# Patient Record
Sex: MALE | Race: BLACK OR AFRICAN AMERICAN | NOT HISPANIC OR LATINO | Employment: FULL TIME | URBAN - METROPOLITAN AREA
[De-identification: names, ages, dates, MRNs, and addresses within clinical notes are randomized per-mention and may not be internally consistent; named-entity substitution may affect disease eponyms.]

---

## 2017-11-19 ENCOUNTER — HOSPITAL ENCOUNTER (EMERGENCY)
Facility: HOSPITAL | Age: 37
Discharge: HOME | End: 2017-11-19
Payer: COMMERCIAL

## 2017-11-19 VITALS
TEMPERATURE: 99 F | WEIGHT: 165 LBS | SYSTOLIC BLOOD PRESSURE: 119 MMHG | DIASTOLIC BLOOD PRESSURE: 71 MMHG | BODY MASS INDEX: 25.01 KG/M2 | HEART RATE: 100 BPM | OXYGEN SATURATION: 96 % | HEIGHT: 68 IN | RESPIRATION RATE: 20 BRPM

## 2017-11-19 DIAGNOSIS — S91.332A PENETRATING WOUND OF LEFT FOOT, INITIAL ENCOUNTER: ICD-10-CM

## 2017-11-19 DIAGNOSIS — T14.8XXA PUNCTURE WOUND: Primary | ICD-10-CM

## 2017-11-19 PROCEDURE — 99283 EMERGENCY DEPT VISIT LOW MDM: CPT

## 2017-11-19 PROCEDURE — 90715 TDAP VACCINE 7 YRS/> IM: CPT | Performed by: NURSE PRACTITIONER

## 2017-11-19 PROCEDURE — 63600175 PHARM REV CODE 636 W HCPCS: Performed by: NURSE PRACTITIONER

## 2017-11-19 PROCEDURE — 90471 IMMUNIZATION ADMIN: CPT | Performed by: NURSE PRACTITIONER

## 2017-11-19 RX ORDER — CEPHALEXIN 500 MG/1
500 CAPSULE ORAL 4 TIMES DAILY
Qty: 20 CAPSULE | Refills: 0 | Status: SHIPPED | OUTPATIENT
Start: 2017-11-19 | End: 2017-11-24

## 2017-11-19 RX ADMIN — CLOSTRIDIUM TETANI TOXOID ANTIGEN (FORMALDEHYDE INACTIVATED), CORYNEBACTERIUM DIPHTHERIAE TOXOID ANTIGEN (FORMALDEHYDE INACTIVATED), BORDETELLA PERTUSSIS TOXOID ANTIGEN (GLUTARALDEHYDE INACTIVATED), BORDETELLA PERTUSSIS FILAMENTOUS HEMAGGLUTININ ANTIGEN (FORMALDEHYDE INACTIVATED), BORDETELLA PERTUSSIS PERTACTIN ANTIGEN, AND BORDETELLA PERTUSSIS FIMBRIAE 2/3 ANTIGEN 0.5 ML: 5; 2; 2.5; 5; 3; 5 INJECTION, SUSPENSION INTRAMUSCULAR at 10:11

## 2017-11-20 NOTE — ED PROVIDER NOTES
Encounter Date: 11/19/2017       History     Chief Complaint   Patient presents with    Laceration     Pt states he tripped over his dog and hit his left foot on the spike wheel.     36-year-old male presents to emergency room with puncture wound to the bottom of the left foot.  States he tripped over a spike wheel and one of the spike stuck him in the foot.  Reports a large amount of bleeding and swelling to the plantar surface of the left foot near the great toe.  Bleeding has since stopped.  Unknown last tetanus shot.  No other injuries during fall.          Review of patient's allergies indicates:  No Known Allergies  Past Medical History:   Diagnosis Date    Back injury      History reviewed. No pertinent surgical history.  History reviewed. No pertinent family history.  Social History   Substance Use Topics    Smoking status: Current Every Day Smoker     Packs/day: 0.25    Smokeless tobacco: Never Used    Alcohol use Yes      Comment: occasionally     Review of Systems   Constitutional: Negative for fever.   HENT: Negative for sore throat.    Respiratory: Negative for shortness of breath.    Cardiovascular: Negative for chest pain.   Gastrointestinal: Negative for nausea.   Genitourinary: Negative for dysuria.   Musculoskeletal: Negative for back pain.   Skin: Positive for wound. Negative for rash.   Neurological: Negative for weakness.   Hematological: Does not bruise/bleed easily.   All other systems reviewed and are negative.      Physical Exam     Initial Vitals [11/19/17 2211]   BP Pulse Resp Temp SpO2   119/71 100 20 98.8 °F (37.1 °C) 96 %      MAP       87         Physical Exam    Nursing note and vitals reviewed.  Constitutional: He appears well-developed and well-nourished. He is not diaphoretic. No distress.   HENT:   Head: Normocephalic and atraumatic.   Eyes: Conjunctivae are normal.   Neck: Normal range of motion. Neck supple.   Cardiovascular: Normal rate and regular rhythm.    Pulmonary/Chest: Breath sounds normal. No respiratory distress. He exhibits no tenderness.   Abdominal: Soft. He exhibits no distension. There is no tenderness.   Musculoskeletal: Normal range of motion.        Left ankle: Normal.        Left foot: There is tenderness.        Feet:    Neurological: He is alert and oriented to person, place, and time. He has normal strength. No cranial nerve deficit or sensory deficit.   Skin: Skin is warm and dry.   Psychiatric: He has a normal mood and affect. His behavior is normal. Judgment and thought content normal.         ED Course   Procedures  Labs Reviewed - No data to display          Medical Decision Making:   Initial Assessment:   36-year-old male presents to emergency room with puncture wound to the bottom of the left foot.  States he tripped over a spike wheel and one of the spike stuck him in the foot.  Reports a large amount of bleeding and swelling to the plantar surface of the left foot near the great toe.  Bleeding has since stopped.  Unknown last tetanus shot.  No other injuries during fall.  There is a small hematoma noted to the plantar aspect of the left foot near the great toe.  Area is tender to palpation.  Strength is normal.  Cap refill is less than 2 seconds.  Strong pedal pulse.  Differential Diagnosis:   Puncture wound, tendon laceration, foreign body  ED Management:  Patient's tetanus immunization updated in the emergency department today.  I'll place patient on antibiotics.  Instructed to keep the area clean and dry.  Ice and elevate the foot.  Monitor for worsening signs of infection.  If any worsening symptoms present return to emergency room.  Patient verbalized understanding.                   ED Course      Clinical Impression:   The primary encounter diagnosis was Puncture wound. A diagnosis of Penetrating wound of left foot, initial encounter was also pertinent to this visit.                           Yasmin Vasquez NP  11/29/17 2008

## 2018-02-27 ENCOUNTER — CLINICAL SUPPORT (OUTPATIENT)
Dept: FAMILY MEDICINE | Facility: CLINIC | Age: 38
End: 2018-02-27

## 2018-02-27 DIAGNOSIS — Z00.00 ROUTINE GENERAL MEDICAL EXAMINATION AT A HEALTH CARE FACILITY: Primary | ICD-10-CM

## 2018-02-27 PROCEDURE — 80305 DRUG TEST PRSMV DIR OPT OBS: CPT | Mod: S$GLB,,, | Performed by: FAMILY MEDICINE

## 2018-02-27 NOTE — PROGRESS NOTES
Soheila has presented today on behalf of [facility]. Soheila Neil has completed Non-DOT Drug Screen .    Total $55    Monika Reveles

## 2018-10-30 ENCOUNTER — CLINICAL SUPPORT (OUTPATIENT)
Dept: FAMILY MEDICINE | Facility: CLINIC | Age: 38
End: 2018-10-30

## 2018-10-30 DIAGNOSIS — Z00.00 ROUTINE GENERAL MEDICAL EXAMINATION AT A HEALTH CARE FACILITY: Primary | ICD-10-CM

## 2018-10-30 PROCEDURE — 80305 DRUG TEST PRSMV DIR OPT OBS: CPT | Mod: S$GLB,,, | Performed by: FAMILY MEDICINE

## 2018-10-31 NOTE — PROGRESS NOTES
Soheila has presented today on behalf of [facility]. Soheila Neil has completed Non-DOT Drug Screen .    Total $55    Monika Reveles

## 2023-02-28 ENCOUNTER — OFFICE VISIT (OUTPATIENT)
Dept: FAMILY MEDICINE | Facility: CLINIC | Age: 43
End: 2023-02-28
Payer: MEDICAID

## 2023-02-28 VITALS
WEIGHT: 165.38 LBS | HEART RATE: 71 BPM | SYSTOLIC BLOOD PRESSURE: 122 MMHG | OXYGEN SATURATION: 98 % | DIASTOLIC BLOOD PRESSURE: 72 MMHG | HEIGHT: 68 IN | BODY MASS INDEX: 25.07 KG/M2

## 2023-02-28 DIAGNOSIS — Z00.00 ANNUAL PHYSICAL EXAM: Primary | ICD-10-CM

## 2023-02-28 DIAGNOSIS — Z11.3 ROUTINE SCREENING FOR STI (SEXUALLY TRANSMITTED INFECTION): ICD-10-CM

## 2023-02-28 DIAGNOSIS — G47.00 INSOMNIA, UNSPECIFIED TYPE: ICD-10-CM

## 2023-02-28 DIAGNOSIS — Z86.59 HX OF SCHIZOPHRENIA: ICD-10-CM

## 2023-02-28 DIAGNOSIS — Z72.0 TOBACCO USE: ICD-10-CM

## 2023-02-28 PROCEDURE — 99386 PR PREVENTIVE VISIT,NEW,40-64: ICD-10-PCS | Mod: 25,S$PBB,, | Performed by: FAMILY MEDICINE

## 2023-02-28 PROCEDURE — 3074F SYST BP LT 130 MM HG: CPT | Mod: CPTII,,, | Performed by: FAMILY MEDICINE

## 2023-02-28 PROCEDURE — 99999 PR PBB SHADOW E&M-EST. PATIENT-LVL V: ICD-10-PCS | Mod: PBBFAC,,, | Performed by: FAMILY MEDICINE

## 2023-02-28 PROCEDURE — 3008F BODY MASS INDEX DOCD: CPT | Mod: CPTII,,, | Performed by: FAMILY MEDICINE

## 2023-02-28 PROCEDURE — 99386 PREV VISIT NEW AGE 40-64: CPT | Mod: 25,S$PBB,, | Performed by: FAMILY MEDICINE

## 2023-02-28 PROCEDURE — 1160F PR REVIEW ALL MEDS BY PRESCRIBER/CLIN PHARMACIST DOCUMENTED: ICD-10-PCS | Mod: CPTII,,, | Performed by: FAMILY MEDICINE

## 2023-02-28 PROCEDURE — 1159F PR MEDICATION LIST DOCUMENTED IN MEDICAL RECORD: ICD-10-PCS | Mod: CPTII,,, | Performed by: FAMILY MEDICINE

## 2023-02-28 PROCEDURE — 3074F PR MOST RECENT SYSTOLIC BLOOD PRESSURE < 130 MM HG: ICD-10-PCS | Mod: CPTII,,, | Performed by: FAMILY MEDICINE

## 2023-02-28 PROCEDURE — 3078F DIAST BP <80 MM HG: CPT | Mod: CPTII,,, | Performed by: FAMILY MEDICINE

## 2023-02-28 PROCEDURE — 99215 OFFICE O/P EST HI 40 MIN: CPT | Mod: PBBFAC,PN | Performed by: FAMILY MEDICINE

## 2023-02-28 PROCEDURE — 3008F PR BODY MASS INDEX (BMI) DOCUMENTED: ICD-10-PCS | Mod: CPTII,,, | Performed by: FAMILY MEDICINE

## 2023-02-28 PROCEDURE — 3078F PR MOST RECENT DIASTOLIC BLOOD PRESSURE < 80 MM HG: ICD-10-PCS | Mod: CPTII,,, | Performed by: FAMILY MEDICINE

## 2023-02-28 PROCEDURE — 1160F RVW MEDS BY RX/DR IN RCRD: CPT | Mod: CPTII,,, | Performed by: FAMILY MEDICINE

## 2023-02-28 PROCEDURE — 1159F MED LIST DOCD IN RCRD: CPT | Mod: CPTII,,, | Performed by: FAMILY MEDICINE

## 2023-02-28 PROCEDURE — 99999 PR PBB SHADOW E&M-EST. PATIENT-LVL V: CPT | Mod: PBBFAC,,, | Performed by: FAMILY MEDICINE

## 2023-02-28 RX ORDER — ALPRAZOLAM 0.25 MG/1
0.25 TABLET ORAL DAILY PRN
Qty: 15 TABLET | Refills: 0 | Status: SHIPPED | OUTPATIENT
Start: 2023-02-28 | End: 2023-04-11

## 2023-02-28 RX ORDER — TRAZODONE HYDROCHLORIDE 50 MG/1
TABLET ORAL
Qty: 90 TABLET | Refills: 1 | Status: SHIPPED | OUTPATIENT
Start: 2023-02-28 | End: 2023-04-11 | Stop reason: SDUPTHER

## 2023-02-28 NOTE — PROGRESS NOTES
PATIENT VISIT FAMILY MEDICINE    CC:   Chief Complaint   Patient presents with    [facility]    Annual Exam       HPI: Soheila Neil  is a 42 y.o. male:    Patient is new to me.  Patient presents alone for annual.     Hx of schizophrenia. Previously saw Psychiatry. Takes Xanax PRN. Previously on other     Patient doing well overall, taking medications as prescribed and with no acute concerns.       ROS: Review of Systems   Constitutional:  Negative for fever.   Respiratory:  Negative for shortness of breath.    Cardiovascular:  Negative for chest pain.     PMHX:   Past Medical History:   Diagnosis Date    Back injury        PSHX: History reviewed. No pertinent surgical history.    FAMHX: History reviewed. No pertinent family history.    SOCHX:   Social History     Socioeconomic History    Marital status:    Tobacco Use    Smoking status: Every Day     Packs/day: 0.25     Types: Cigarettes    Smokeless tobacco: Never   Substance and Sexual Activity    Alcohol use: Yes     Comment: occasionally    Drug use: No       ALLERGIES: Review of patient's allergies indicates:  No Known Allergies    MEDS:   Current Outpatient Medications:     famotidine (PEPCID) 20 MG tablet, Take 1 tablet by mouth once daily with ibuprofen as needed for GI protection, Disp: 30 tablet, Rfl: 0    famotidine (PEPCID) 20 MG tablet, Take 1 tablet (20 mg total) by mouth daily as needed. To be taken with ibuprofen for GI protection, Disp: 30 tablet, Rfl: 0    ibuprofen (ADVIL,MOTRIN) 800 MG tablet, Take 1 tablet (800 mg total) by mouth once daily as needed for pain and inflammation. take with food, Disp: 30 tablet, Rfl: 0    ondansetron (ZOFRAN-ODT) 4 MG TbDL, Dissolve 1 tablet (4 mg total) by mouth daily in the morning as needed for nause/vomiting, Disp: 30 tablet, Rfl: 0    oxyCODONE-acetaminophen (PERCOCET)  mg per tablet, Take 1 tablet by mouth 3 (three) times daily as needed for pain., Disp: 90 tablet, Rfl: 0     ALPRAZolam (XANAX) 0.25 MG tablet, Take 1 tablet (0.25 mg total) by mouth daily as needed for Anxiety or Insomnia., Disp: 15 tablet, Rfl: 0    hydrocodone-acetaminophen 10-325mg (NORCO)  mg Tab, Take 1 tablet by mouth 4 (four) times daily., Disp: , Rfl: 0    ibuprofen (ADVIL,MOTRIN) 800 MG tablet, Take 1 tablet by mouth once daily with food as needed for pain and inflammation (Patient not taking: Reported on 2/28/2023), Disp: 30 tablet, Rfl: 0    naproxen (NAPROSYN) 500 MG tablet, , Disp: , Rfl:     ondansetron (ZOFRAN-ODT) 4 MG TbDL, Take 1 tablet (4 mg total) by mouth daily as needed for nausea/vomitting. (Patient not taking: Reported on 2/28/2023), Disp: 30 tablet, Rfl: 1    ondansetron (ZOFRAN-ODT) 4 MG TbDL, Dissolve 1 tablet under the tongue every morning as needed for nausea/vomiting (Patient not taking: Reported on 2/28/2023), Disp: 30 tablet, Rfl: 0    ondansetron (ZOFRAN-ODT) 4 MG TbDL, Dissolve 1 tablet (4 mg total) by mouth every morning as needed for nausea/vomiting (Patient not taking: Reported on 2/28/2023), Disp: 30 tablet, Rfl: 0    oxyCODONE-acetaminophen (PERCOCET)  mg per tablet, Take 1 tablet by mouth three times daily as needed for pain (Patient not taking: Reported on 2/28/2023), Disp: 90 tablet, Rfl: 0    oxyCODONE-acetaminophen (PERCOCET)  mg per tablet, Take 1 tablet by mouth 3 (three) times daily as needed for pain. (Patient not taking: Reported on 2/28/2023), Disp: 90 tablet, Rfl: 0    oxyCODONE-acetaminophen (PERCOCET)  mg per tablet, Take 1 tablet by mouth three times daily as needed for pain (Patient not taking: Reported on 2/28/2023), Disp: 45 tablet, Rfl: 0    oxyCODONE-acetaminophen (PERCOCET)  mg per tablet, Take 1 tablet by mouth 3 (three) times daily as needed for pain. (Patient not taking: Reported on 2/28/2023), Disp: 90 tablet, Rfl: 0    traZODone (DESYREL) 50 MG tablet, Take 1-3 tablets nightly as needed for sleep, Disp: 90 tablet, Rfl:  "1    OBJECTIVE:   Vitals:    02/28/23 0937   BP: 122/72   BP Location: Left arm   Patient Position: Sitting   BP Method: Large (Manual)   Pulse: 71   SpO2: 98%   Weight: 75 kg (165 lb 6.4 oz)   Height: 5' 8" (1.727 m)     Body mass index is 25.15 kg/m².      Physical Exam  Vitals and nursing note reviewed.   Constitutional:       Appearance: Normal appearance.   HENT:      Head: Normocephalic.   Eyes:      General:         Right eye: No discharge.         Left eye: No discharge.      Extraocular Movements: Extraocular movements intact.   Cardiovascular:      Rate and Rhythm: Normal rate and regular rhythm.      Heart sounds: Normal heart sounds.   Pulmonary:      Effort: Pulmonary effort is normal.      Breath sounds: Normal breath sounds.   Skin:     Comments: No obvious rash on exposed skin   Neurological:      Mental Status: He is alert.   Psychiatric:         Behavior: Behavior normal.         LABS:   A1C:      CBC:  Recent Labs   Lab 02/28/23  1046   WBC 8.40   RBC 4.21 L   Hemoglobin 11.9 L   Hematocrit 37.5 L   Platelets 399   MCV 89   MCH 28.3   MCHC 31.7 L     CMP:  Recent Labs   Lab 02/28/23  1046   Glucose 87   Calcium 8.9   Albumin 4.7   Total Protein 7.4   Sodium 142   Potassium 4.0   CO2 30 H   Chloride 104   BUN 19   Creatinine 1.18   Alkaline Phosphatase 56   ALT 18   AST 23   Total Bilirubin 0.4     LIPIDS:  Recent Labs   Lab 02/28/23  1046   TSH 2.560     TSH:  Recent Labs   Lab 02/28/23  1046   TSH 2.560         ASSESSMENT & PLAN:    Problem List Items Addressed This Visit          Psychiatric    Hx of schizophrenia    Overview     Recommend he see Psychiatry for hx of schizophrenia.          Relevant Orders    Ambulatory referral/consult to Psychiatry    Ambulatory referral/consult to Outpatient Case Management    TSH (Completed)    T4, Free    Toxicology Screen, Urine    TESTOSTERONE       Other    Insomnia    Overview     Advise he try trazodone for insomnia. Can use Xanax only as a back up. " Not intended for long term/daily use.  reviewed and appropriate. Discussed risks/side effects medications            Relevant Medications    traZODone (DESYREL) 50 MG tablet    ALPRAZolam (XANAX) 0.25 MG tablet    Tobacco use    Relevant Orders    Ambulatory referral/consult to Smoking Cessation Program     Other Visit Diagnoses       Annual physical exam    -  Primary    Relevant Orders    CBC Auto Differential (Completed)    Comprehensive Metabolic Panel (Completed)    Hemoglobin A1C    Lipid Panel    Microalbumin/Creatinine Ratio, Urine    Routine screening for STI (sexually transmitted infection)        Relevant Orders    Hepatitis C Antibody    HIV 1/2 Ag/Ab (4th Gen)    RPR    C. trachomatis/N. gonorrhoeae by AMP DNA [facility]; Urine          Preventative cares discussed and updated as needed. Lifestyle modifications discussed as needed.      Follow up in about 6 weeks (around 4/11/2023) for med f/u.      RTC/ED precautions discussed where applicable.   Encouraged patient to let me know if there are any further questions/concerns.     Advise patient/caretaker to check with insurance regarding orders to avoid unexpected fees/costs.     The patient/caretaker indicates understanding of these issues and agrees with the plan.    Dr. Laureano Marshall MD  Family Medicine

## 2023-03-01 ENCOUNTER — PATIENT OUTREACH (OUTPATIENT)
Dept: ADMINISTRATIVE | Facility: OTHER | Age: 43
End: 2023-03-01
Payer: MEDICAID

## 2023-03-01 ENCOUNTER — TELEPHONE (OUTPATIENT)
Dept: FAMILY MEDICINE | Facility: CLINIC | Age: 43
End: 2023-03-01
Payer: MEDICAID

## 2023-03-01 ENCOUNTER — TELEPHONE (OUTPATIENT)
Dept: ADMINISTRATIVE | Facility: OTHER | Age: 43
End: 2023-03-01
Payer: MEDICAID

## 2023-03-01 DIAGNOSIS — R79.89 LOW TESTOSTERONE: Primary | ICD-10-CM

## 2023-03-01 NOTE — PROGRESS NOTES
CHW - Initial Contact    This Community Health Worker completed the Social Determinant of Health questionnaire with patient via telephone today.    Pt identified barriers of most importance are: patient just lost his job, and would like help with food, utilities payments and financial resources.   Referrals to community agencies completed with patient/caregiver consent outside of [facility] include: yes, [employer].  Referrals were put through [facility] - yes: [facility].  Support and Services: has no support.  Other information discussed the patient needs / wants help with: none at this time.   Follow up required: yes.  Follow-up Outreach - Due: 3/7/2023

## 2023-03-01 NOTE — TELEPHONE ENCOUNTER
Spoke with pt to inform him that his blood type is not in his chart and per Dr. Marshall she can order type and screening at next visit.

## 2023-03-01 NOTE — TELEPHONE ENCOUNTER
----- Message from Laureano Marshall MD sent at 3/1/2023 12:58 PM CST -----  Let patient know his testosterone level is slightly low. I have referred him to Endocrinology for this.

## 2023-03-01 NOTE — TELEPHONE ENCOUNTER
Spoke with patient.  Informed of test results and referral to Endocrinology. Verbalized understanding.

## 2023-03-07 ENCOUNTER — CLINICAL SUPPORT (OUTPATIENT)
Dept: SMOKING CESSATION | Facility: CLINIC | Age: 43
End: 2023-03-07

## 2023-03-07 DIAGNOSIS — F17.200 NICOTINE DEPENDENCE: Primary | ICD-10-CM

## 2023-03-07 PROCEDURE — 99404 PREV MED CNSL INDIV APPRX 60: CPT | Mod: S$GLB,,, | Performed by: GENERAL PRACTICE

## 2023-03-07 PROCEDURE — 99999 PR PBB SHADOW E&M-EST. PATIENT-LVL II: ICD-10-PCS | Mod: PBBFAC,,,

## 2023-03-07 PROCEDURE — 99404 PR PREVENT COUNSEL,INDIV,60 MIN: ICD-10-PCS | Mod: S$GLB,,, | Performed by: GENERAL PRACTICE

## 2023-03-07 PROCEDURE — 99999 PR PBB SHADOW E&M-EST. PATIENT-LVL II: CPT | Mod: PBBFAC,,,

## 2023-03-07 RX ORDER — IBUPROFEN 200 MG
1 TABLET ORAL DAILY
Qty: 14 PATCH | Refills: 0 | Status: SHIPPED | OUTPATIENT
Start: 2023-03-07

## 2023-03-07 RX ORDER — BUPROPION HYDROCHLORIDE 150 MG/1
150 TABLET ORAL DAILY
Qty: 30 TABLET | Refills: 0 | Status: SHIPPED | OUTPATIENT
Start: 2023-03-07 | End: 2023-03-14

## 2023-03-07 NOTE — PROGRESS NOTES
Patient seen today as a new intake for smoking cessation. Patient uses cigarettes and vapes. CO= 13 ppm with having had last cigarette 30 minutes ago.

## 2023-03-08 ENCOUNTER — PATIENT OUTREACH (OUTPATIENT)
Dept: ADMINISTRATIVE | Facility: OTHER | Age: 43
End: 2023-03-08
Payer: MEDICAID

## 2023-03-08 NOTE — PROGRESS NOTES
CHW - Outreach Attempt    Community Health Worker left a voicemail message for 1st attempt to contact patient regarding: follow up visit.

## 2023-03-14 ENCOUNTER — CLINICAL SUPPORT (OUTPATIENT)
Dept: SMOKING CESSATION | Facility: CLINIC | Age: 43
End: 2023-03-14

## 2023-03-14 DIAGNOSIS — F17.200 NICOTINE DEPENDENCE: Primary | ICD-10-CM

## 2023-03-14 PROCEDURE — 99999 PR PBB SHADOW E&M-EST. PATIENT-LVL I: ICD-10-PCS | Mod: PBBFAC,,,

## 2023-03-14 PROCEDURE — 99407 PR TOBACCO USE CESSATION INTENSIVE >10 MINUTES: ICD-10-PCS | Mod: S$GLB,,, | Performed by: GENERAL PRACTICE

## 2023-03-14 PROCEDURE — 99999 PR PBB SHADOW E&M-EST. PATIENT-LVL I: CPT | Mod: PBBFAC,,,

## 2023-03-14 PROCEDURE — 99407 BEHAV CHNG SMOKING > 10 MIN: CPT | Mod: S$GLB,,, | Performed by: GENERAL PRACTICE

## 2023-03-14 RX ORDER — BUPROPION HYDROCHLORIDE 150 MG/1
150 TABLET, EXTENDED RELEASE ORAL 2 TIMES DAILY
Qty: 60 TABLET | Refills: 0 | Status: SHIPPED | OUTPATIENT
Start: 2023-03-14 | End: 2024-03-13

## 2023-03-22 ENCOUNTER — TELEPHONE (OUTPATIENT)
Dept: SMOKING CESSATION | Facility: CLINIC | Age: 43
End: 2023-03-22
Payer: MEDICAID

## 2023-03-22 NOTE — PROGRESS NOTES
CHW - Follow Up    This Community Health Worker completed a follow up visit with patient via telephone today.  Pt/Caregiver reported: has no needs at this time.  Community Health Worker provided: will follow up in one week for a possible case closure.  Follow up required: yes.  Follow-up Outreach - Due: 3/28/2023

## 2023-04-04 ENCOUNTER — CLINICAL SUPPORT (OUTPATIENT)
Dept: SMOKING CESSATION | Facility: CLINIC | Age: 43
End: 2023-04-04

## 2023-04-04 DIAGNOSIS — F17.200 NICOTINE DEPENDENCE: Primary | ICD-10-CM

## 2023-04-04 PROCEDURE — 99404 PR PREVENT COUNSEL,INDIV,60 MIN: ICD-10-PCS | Mod: S$GLB,,, | Performed by: GENERAL PRACTICE

## 2023-04-04 PROCEDURE — 99404 PREV MED CNSL INDIV APPRX 60: CPT | Mod: S$GLB,,, | Performed by: GENERAL PRACTICE

## 2023-04-04 NOTE — PROGRESS NOTES
Individual Follow-Up Form    4/4/2023    Quit Date: NA    Clinical Status of Patient: Outpatient    Continuing Medication: yes  Wellbutrin    Other Medications: Patches     Target Symptoms: Withdrawal and medication side effects. The following were rated moderate (3) to severe (4) on TCRS:  Moderate (3): none  Severe (4): none    Comments: completion of TCRS (Tobacco Cessation Rating Scale) learned addiction model, cues/triggers, personal reasons for quitting, medications, goals, quit date. Patient states that he is smoking a lot less cpd now, and not every day. Patient states that he has gone days without having a cigarette, but still uses vape. Patient states that he figured out that he is not vaping as much anymore. Patient was able to point out triggers. Patient advised on strategies. CO= 2 ppm. Patient continues on Wellbutrin, but states that he has been taking it once per day, in the morning. Patient advised on going to [facility]. Patient states that he was afraid that he may not be able to sleep at night. Patient is also ordered on 14 mg Nicotine Patch QD, states that he still has not received them from the pharmacy. Will call and follow up with this prescription. The patient denies any abnormal behavioral or mental changes at this time.      Diagnosis: F17.210    Next Visit: 2 weeks

## 2023-04-06 ENCOUNTER — PATIENT OUTREACH (OUTPATIENT)
Dept: ADMINISTRATIVE | Facility: OTHER | Age: 43
End: 2023-04-06
Payer: MEDICAID

## 2023-04-11 ENCOUNTER — OFFICE VISIT (OUTPATIENT)
Dept: FAMILY MEDICINE | Facility: CLINIC | Age: 43
End: 2023-04-11
Payer: MEDICAID

## 2023-04-11 VITALS
DIASTOLIC BLOOD PRESSURE: 62 MMHG | SYSTOLIC BLOOD PRESSURE: 122 MMHG | HEART RATE: 74 BPM | HEIGHT: 68 IN | OXYGEN SATURATION: 97 % | WEIGHT: 163.13 LBS | BODY MASS INDEX: 24.72 KG/M2

## 2023-04-11 DIAGNOSIS — G47.00 INSOMNIA, UNSPECIFIED TYPE: ICD-10-CM

## 2023-04-11 DIAGNOSIS — Z72.0 TOBACCO USE: ICD-10-CM

## 2023-04-11 DIAGNOSIS — Z86.59 HX OF SCHIZOPHRENIA: Primary | ICD-10-CM

## 2023-04-11 DIAGNOSIS — R79.89 LOW TESTOSTERONE: ICD-10-CM

## 2023-04-11 DIAGNOSIS — G89.4 CHRONIC PAIN SYNDROME: ICD-10-CM

## 2023-04-11 PROBLEM — G89.29 CHRONIC PAIN: Status: ACTIVE | Noted: 2023-04-11

## 2023-04-11 PROCEDURE — 1160F RVW MEDS BY RX/DR IN RCRD: CPT | Mod: CPTII,,, | Performed by: FAMILY MEDICINE

## 2023-04-11 PROCEDURE — 99214 OFFICE O/P EST MOD 30 MIN: CPT | Mod: S$PBB,,, | Performed by: FAMILY MEDICINE

## 2023-04-11 PROCEDURE — 1159F PR MEDICATION LIST DOCUMENTED IN MEDICAL RECORD: ICD-10-PCS | Mod: CPTII,,, | Performed by: FAMILY MEDICINE

## 2023-04-11 PROCEDURE — 99999 PR PBB SHADOW E&M-EST. PATIENT-LVL IV: ICD-10-PCS | Mod: PBBFAC,,, | Performed by: FAMILY MEDICINE

## 2023-04-11 PROCEDURE — 3074F SYST BP LT 130 MM HG: CPT | Mod: CPTII,,, | Performed by: FAMILY MEDICINE

## 2023-04-11 PROCEDURE — 1159F MED LIST DOCD IN RCRD: CPT | Mod: CPTII,,, | Performed by: FAMILY MEDICINE

## 2023-04-11 PROCEDURE — 3008F PR BODY MASS INDEX (BMI) DOCUMENTED: ICD-10-PCS | Mod: CPTII,,, | Performed by: FAMILY MEDICINE

## 2023-04-11 PROCEDURE — 3078F PR MOST RECENT DIASTOLIC BLOOD PRESSURE < 80 MM HG: ICD-10-PCS | Mod: CPTII,,, | Performed by: FAMILY MEDICINE

## 2023-04-11 PROCEDURE — 3044F PR MOST RECENT HEMOGLOBIN A1C LEVEL <7.0%: ICD-10-PCS | Mod: CPTII,,, | Performed by: FAMILY MEDICINE

## 2023-04-11 PROCEDURE — 99999 PR PBB SHADOW E&M-EST. PATIENT-LVL IV: CPT | Mod: PBBFAC,,, | Performed by: FAMILY MEDICINE

## 2023-04-11 PROCEDURE — 99214 PR OFFICE/OUTPT VISIT, EST, LEVL IV, 30-39 MIN: ICD-10-PCS | Mod: S$PBB,,, | Performed by: FAMILY MEDICINE

## 2023-04-11 PROCEDURE — 3008F BODY MASS INDEX DOCD: CPT | Mod: CPTII,,, | Performed by: FAMILY MEDICINE

## 2023-04-11 PROCEDURE — 3044F HG A1C LEVEL LT 7.0%: CPT | Mod: CPTII,,, | Performed by: FAMILY MEDICINE

## 2023-04-11 PROCEDURE — 3074F PR MOST RECENT SYSTOLIC BLOOD PRESSURE < 130 MM HG: ICD-10-PCS | Mod: CPTII,,, | Performed by: FAMILY MEDICINE

## 2023-04-11 PROCEDURE — 99214 OFFICE O/P EST MOD 30 MIN: CPT | Mod: PBBFAC,PN | Performed by: FAMILY MEDICINE

## 2023-04-11 PROCEDURE — 3078F DIAST BP <80 MM HG: CPT | Mod: CPTII,,, | Performed by: FAMILY MEDICINE

## 2023-04-11 PROCEDURE — 1160F PR REVIEW ALL MEDS BY PRESCRIBER/CLIN PHARMACIST DOCUMENTED: ICD-10-PCS | Mod: CPTII,,, | Performed by: FAMILY MEDICINE

## 2023-04-11 RX ORDER — TRAZODONE HYDROCHLORIDE 50 MG/1
100 TABLET ORAL NIGHTLY
Qty: 180 TABLET | Refills: 3 | Status: SHIPPED | OUTPATIENT
Start: 2023-04-11

## 2023-04-11 NOTE — PROGRESS NOTES
PATIENT VISIT FAMILY MEDICINE    CC:   Chief Complaint   Patient presents with    Follow-up     Medication f/u       HPI: Soheila Neil  is a 42 y.o. male:    Patient is known to me.  Patient presents alone for routine follow up on chronic conditions.    Insomnia improved on trazodone. Pain management does not want him to take Xanax so he has not been. Wants to see Psychiatry and Endocrine. Hx of low T previously on testosterone.     ROS: ROS    PMHX:   Past Medical History:   Diagnosis Date    Back injury        PSHX: History reviewed. No pertinent surgical history.    FAMHX: History reviewed. No pertinent family history.    SOCHX:   Social History     Socioeconomic History    Marital status:    Tobacco Use    Smoking status: Every Day     Packs/day: 0.25     Types: Cigarettes    Smokeless tobacco: Never   Substance and Sexual Activity    Alcohol use: Yes     Comment: occasionally    Drug use: No     Social Determinants of Health     Financial Resource Strain: High Risk    Difficulty of Paying Living Expenses: Very hard   Food Insecurity: Food Insecurity Present    Worried About Running Out of Food in the Last Year: Often true    Ran Out of Food in the Last Year: Often true   Transportation Needs: No Transportation Needs    Lack of Transportation (Medical): No    Lack of Transportation (Non-Medical): No   Physical Activity: Inactive    Days of Exercise per Week: 0 days    Minutes of Exercise per Session: 0 min   Stress: Stress Concern Present    Feeling of Stress : To some extent   Social Connections: Socially Isolated    Frequency of Communication with Friends and Family: Once a week    Frequency of Social Gatherings with Friends and Family: Once a week    Attends Faith Services: Never    Active Member of Clubs or Organizations: No    Attends Club or Organization Meetings: Never    Marital Status:    Housing Stability: Low Risk     Unable to Pay for Housing in the Last Year: No    Number of  "Places Lived in the Last Year: 1    Unstable Housing in the Last Year: No       ALLERGIES: Review of patient's allergies indicates:  No Known Allergies    MEDS:   Current Outpatient Medications:     buPROPion (WELLBUTRIN SR) 150 MG TBSR 12 hr tablet, Take one tablet by mouth once per day until directed, then advance to 1 tablet twice per day., Disp: 60 tablet, Rfl: 0    famotidine (PEPCID) 20 MG tablet, Take 1 tablet by mouth once daily with ibuprofen as needed for GI protection, Disp: 30 tablet, Rfl: 0    ibuprofen (ADVIL,MOTRIN) 800 MG tablet, Take 1 tablet (800 mg total) by mouth daily as needed for pain with food., Disp: 30 tablet, Rfl: 0    nicotine (NICODERM CQ) 14 mg/24 hr, Place 1 patch onto the skin once daily., Disp: 14 patch, Rfl: 0    ondansetron (ZOFRAN-ODT) 4 MG TbDL, Take 1 tablet (4 mg total) by mouth daily as needed for nausea/vomitting., Disp: 30 tablet, Rfl: 1    oxyCODONE-acetaminophen (PERCOCET)  mg per tablet, Take 1 tablet by mouth 3 (three) times daily as needed for pain., Disp: 90 tablet, Rfl: 0    traZODone (DESYREL) 50 MG tablet, Take 2 tablets (100 mg total) by mouth every evening. Take 1-3 tablets nightly as needed for sleep, Disp: 180 tablet, Rfl: 3    OBJECTIVE:   Vitals:    04/11/23 0930   BP: 122/62   BP Location: Left arm   Patient Position: Sitting   BP Method: Large (Manual)   Pulse: 74   SpO2: 97%   Weight: 74 kg (163 lb 2.3 oz)   Height: 5' 8" (1.727 m)     Body mass index is 24.81 kg/m².      Physical Exam  Vitals and nursing note reviewed.   Constitutional:       Appearance: Normal appearance.   HENT:      Head: Normocephalic and atraumatic.   Eyes:      General: No scleral icterus.     Extraocular Movements: Extraocular movements intact.   Pulmonary:      Effort: Pulmonary effort is normal.   Neurological:      Mental Status: He is alert.         LABS:   A1C:  Recent Labs   Lab 02/28/23  1046   Hemoglobin A1C 5.1     CBC:  Recent Labs   Lab 02/28/23  1046   WBC 8.40 "   RBC 4.21 L   Hemoglobin 11.9 L   Hematocrit 37.5 L   Platelets 399   MCV 89   MCH 28.3   MCHC 31.7 L     CMP:  Recent Labs   Lab 02/28/23  1046   Glucose 87   Calcium 8.9   Albumin 4.7   Total Protein 7.4   Sodium 142   Potassium 4.0   CO2 30 H   Chloride 104   BUN 19   Creatinine 1.18   Alkaline Phosphatase 56   ALT 18   AST 23   Total Bilirubin 0.4     LIPIDS:  Recent Labs   Lab 02/28/23  1046   TSH 2.560   HDL 77 H   Cholesterol 177   Triglycerides 70   LDL Cholesterol 86.0   HDL/Cholesterol Ratio 43.5   Non-HDL Cholesterol 100   Total Cholesterol/HDL Ratio 2.3     TSH:  Recent Labs   Lab 02/28/23  1046   TSH 2.560         ASSESSMENT & PLAN:    Problem List Items Addressed This Visit          Neuro    Chronic pain    Overview     On percocet per pain management              Psychiatric    Hx of schizophrenia - Primary    Overview     Recommend he see Psychiatry for hx of schizophrenia.          Relevant Orders    Ambulatory referral/consult to Psychiatry       Endocrine    Low testosterone    Overview     Referred to Endocrine              Other    Insomnia    Overview     Continue trazodone         Relevant Medications    traZODone (DESYREL) 50 MG tablet    Tobacco use    Overview     In smoking cessation program                Follow up in about 1 year (around 4/11/2024) for annual.      RTC/ED precautions discussed where applicable.   Encouraged patient to let me know if there are any further questions/concerns.     Advise patient/caretaker to check with insurance regarding orders to avoid unexpected fees/costs.     The patient/caretaker indicates understanding of these issues and agrees with the plan.    Dr. Laureano Marshall MD  Family Medicine

## 2023-04-11 NOTE — PATIENT INSTRUCTIONS
CALL THIS NUMBER FOR YOUR PSYCHIATRY REFERRAL: [phone]    Central Pricing Office at [phone] or [phone] to obtain a quote for services.

## 2023-04-25 ENCOUNTER — CLINICAL SUPPORT (OUTPATIENT)
Dept: SMOKING CESSATION | Facility: CLINIC | Age: 43
End: 2023-04-25

## 2023-04-25 DIAGNOSIS — F17.200 NICOTINE DEPENDENCE: Primary | ICD-10-CM

## 2023-04-25 PROCEDURE — 99999 PR PBB SHADOW E&M-EST. PATIENT-LVL I: CPT | Mod: PBBFAC,,,

## 2023-04-25 PROCEDURE — 99404 PREV MED CNSL INDIV APPRX 60: CPT | Mod: S$GLB,,, | Performed by: GENERAL PRACTICE

## 2023-04-25 PROCEDURE — 99999 PR PBB SHADOW E&M-EST. PATIENT-LVL I: ICD-10-PCS | Mod: PBBFAC,,,

## 2023-04-25 PROCEDURE — 99404 PR PREVENT COUNSEL,INDIV,60 MIN: ICD-10-PCS | Mod: S$GLB,,, | Performed by: GENERAL PRACTICE

## 2023-04-25 NOTE — PROGRESS NOTES
Individual Follow-Up Form    4/25/2023    Quit Date: NA    Clinical Status of Patient: Outpatient    Continuing Medication: yes  Wellbutrin    Other Medications: none     Target Symptoms: Withdrawal and medication side effects. The following were rated moderate (3) to severe (4) on TCRS:  Moderate (3): none  Severe (4): none    Comments: Spoke with patient via telephonic visit in regards to smoking cessation. Patient states that he is still vaping. Patient is currently on Wellbutrin an reports no negative side effects at this time. Patient states that he still has not received Nicotine Patches yet. Will call pharmacy regarding matter. Discussed short and long term goals. Patient states that he realizes that he has to change his mindset with vaping and was able to not keep vape on him. Patient advised on strategies, behavior change and medication. The patient denies any abnormal behavioral or mental changes at this time.      Diagnosis: F17.210    Next Visit: 2 weeks

## 2023-05-09 ENCOUNTER — CLINICAL SUPPORT (OUTPATIENT)
Dept: SMOKING CESSATION | Facility: CLINIC | Age: 43
End: 2023-05-09
Payer: COMMERCIAL

## 2023-05-09 DIAGNOSIS — F17.200 NICOTINE DEPENDENCE: Primary | ICD-10-CM

## 2023-05-09 PROCEDURE — 99999 PR PBB SHADOW E&M-EST. PATIENT-LVL I: CPT | Mod: PBBFAC,,,

## 2023-05-09 PROCEDURE — 99403 PREV MED CNSL INDIV APPRX 45: CPT | Mod: S$PBB,,, | Performed by: GENERAL PRACTICE

## 2023-05-09 PROCEDURE — 99999 PR PBB SHADOW E&M-EST. PATIENT-LVL I: ICD-10-PCS | Mod: PBBFAC,,,

## 2023-05-09 PROCEDURE — 99403 PR PREVENT COUNSEL,INDIV,45 MIN: ICD-10-PCS | Mod: S$PBB,,, | Performed by: GENERAL PRACTICE

## 2023-05-09 NOTE — PROGRESS NOTES
Individual Follow-Up Form    5/9/2023    Quit Date: NA    Clinical Status of Patient: Outpatient    Continuing Medication: yes  Wellbutrin    Other Medications: Patches     Target Symptoms: Withdrawal and medication side effects. The following were rated moderate (3) to severe (4) on TCRS:  Moderate (3): none   Severe (4): none    Comments: Sharing last weeks challenges, triggers, and coping activities to keep making progress toward cessation, completion of TCRS (Tobacco Cessation Rating Scale) reviewed addiction model, personal reasons for quitting, medications, goals, quit date. Patient states that he is still using his Vape, but lost it a couple of days ago and ended up smoking 2 cigarettes within the course of 5 days. Patient remains on medication and reports no negative side effects at this time. Patient advised on strategies, behavior change and medication. The patient denies any abnormal behavioral or mental changes at this time.      Diagnosis: F17.210    Next Visit: 2 weeks

## 2023-05-23 ENCOUNTER — CLINICAL SUPPORT (OUTPATIENT)
Dept: SMOKING CESSATION | Facility: CLINIC | Age: 43
End: 2023-05-23

## 2023-05-23 DIAGNOSIS — F17.200 NICOTINE DEPENDENCE: Primary | ICD-10-CM

## 2023-05-23 PROCEDURE — 99999 PR PBB SHADOW E&M-EST. PATIENT-LVL I: CPT | Mod: PBBFAC,,,

## 2023-05-23 PROCEDURE — 99999 PR PBB SHADOW E&M-EST. PATIENT-LVL I: ICD-10-PCS | Mod: PBBFAC,,,

## 2023-05-23 PROCEDURE — 99404 PR PREVENT COUNSEL,INDIV,60 MIN: ICD-10-PCS | Mod: S$GLB,,, | Performed by: GENERAL PRACTICE

## 2023-05-23 PROCEDURE — 99404 PREV MED CNSL INDIV APPRX 60: CPT | Mod: S$GLB,,, | Performed by: GENERAL PRACTICE

## 2023-05-24 NOTE — PROGRESS NOTES
Individual Follow-Up Form    5/24/2023    Quit Date: TBD    Clinical Status of Patient: Outpatient    Continuing Medication: No    Other Medications: none     Target Symptoms: Withdrawal and medication side effects. The following were rated moderate (3) to severe (4) on TCRS:  Moderate (3): none  Severe (4): none    Comments: Spoke with patient via telephonic visit in regards to smoking cessation. completion of TCRS (Tobacco Cessation Rating Scale) reviewed strategies, habitual behavior, high risks situations, understanding urges and cravings, stress and relaxation with open discussion and additional interventions, Introduced lapses, relapses, understanding them and analyzing the situation of a lapse, conflict issues that may be linked to a lapse.  Patient states that he has not smoked or vaped in about a week. Patient states that he is feeling pretty good about not smoking. Patient also states that he ran out of medication and has not needed any. The patient denies any abnormal behavioral or mental changes at this time.      Diagnosis: F17.200    Next Visit: 2 weeks

## 2023-06-06 ENCOUNTER — CLINICAL SUPPORT (OUTPATIENT)
Dept: SMOKING CESSATION | Facility: CLINIC | Age: 43
End: 2023-06-06

## 2023-06-06 DIAGNOSIS — F17.200 NICOTINE DEPENDENCE: Primary | ICD-10-CM

## 2023-06-06 PROCEDURE — 99999 PR PBB SHADOW E&M-EST. PATIENT-LVL I: CPT | Mod: PBBFAC,,,

## 2023-06-06 PROCEDURE — 99999 PR PBB SHADOW E&M-EST. PATIENT-LVL I: ICD-10-PCS | Mod: PBBFAC,,,

## 2023-06-06 PROCEDURE — 99402 PREV MED CNSL INDIV APPRX 30: CPT | Mod: S$GLB,,, | Performed by: GENERAL PRACTICE

## 2023-06-06 PROCEDURE — 99402 PR PREVENT COUNSEL,INDIV,30 MIN: ICD-10-PCS | Mod: S$GLB,,, | Performed by: GENERAL PRACTICE

## 2023-06-06 NOTE — PROGRESS NOTES
Individual Follow-Up Form    6/6/2023    Quit Date: NA    Clinical Status of Patient: Outpatient    Continuing Medication: no    Other Medications: none     Target Symptoms: Withdrawal and medication side effects. The following were rated moderate (3) to severe (4) on TCRS:  Moderate (3): none  Severe (4): none    Comments: Spoke with patient via telephonic visit in regards to smoking cessation. Patient states that he remains tobacco free at this time. Patient commended on success. Patient advised lapse/relapse prevention strategies. Patient seems determined to move forward with out using cigarettes or vape. Updated in patients chart as being a former smoker. Advised patient of any future follow up visits that he may need. The patient denies any abnormal behavioral or mental changes at this time.      Diagnosis: F17.200    Next Visit: 2 weeks

## 2023-06-07 ENCOUNTER — CLINICAL SUPPORT (OUTPATIENT)
Dept: SMOKING CESSATION | Facility: CLINIC | Age: 43
End: 2023-06-07

## 2023-06-07 DIAGNOSIS — F17.200 NICOTINE DEPENDENCE: Primary | ICD-10-CM

## 2023-06-07 PROCEDURE — 99407 PR TOBACCO USE CESSATION INTENSIVE >10 MINUTES: ICD-10-PCS | Mod: S$GLB,,,

## 2023-06-07 PROCEDURE — 99407 BEHAV CHNG SMOKING > 10 MIN: CPT | Mod: S$GLB,,,

## 2023-06-07 PROCEDURE — 99999 PR PBB SHADOW E&M-EST. PATIENT-LVL I: CPT | Mod: PBBFAC,,,

## 2023-06-07 PROCEDURE — 99999 PR PBB SHADOW E&M-EST. PATIENT-LVL I: ICD-10-PCS | Mod: PBBFAC,,,

## 2023-06-07 NOTE — PROGRESS NOTES
Spoke with patient today in regard to smoking cessation progress for 3 month telephone follow up, he states tobacco free. Commended patient on the accomplishment thus far. Patient is currently enrolled in the program with a scheduled follow up visit. He states no tobacco cessation medication use at this time. Informed patient of benefit period, future follow ups, and contact information if any further help or support is needed. Will complete smart form for 3 month follow up on Quit attempt #1.

## 2023-06-20 ENCOUNTER — CLINICAL SUPPORT (OUTPATIENT)
Dept: SMOKING CESSATION | Facility: CLINIC | Age: 43
End: 2023-06-20

## 2023-06-20 DIAGNOSIS — F17.200 NICOTINE DEPENDENCE: Primary | ICD-10-CM

## 2023-06-20 PROCEDURE — 99402 PR PREVENT COUNSEL,INDIV,30 MIN: ICD-10-PCS | Mod: S$GLB,,, | Performed by: GENERAL PRACTICE

## 2023-06-20 PROCEDURE — 99402 PREV MED CNSL INDIV APPRX 30: CPT | Mod: S$GLB,,, | Performed by: GENERAL PRACTICE

## 2023-06-28 NOTE — PROGRESS NOTES
Individual Follow-Up Form    6/28/2023    Quit Date: NA    Clinical Status of Patient: Outpatient    Continuing Medication: no    Other Medications: none     Target Symptoms: Withdrawal and medication side effects. The following were rated moderate (3) to severe (4) on TCRS:  Moderate (3): None  Severe (4): None    Comments: Spoke with patient via telephonic visit in regards to smoking cessation. Patient states that he remains tobacco free and is also off of medication for smoking cessation. Commended patient on achievement. Patient reports no negative side effects and is feeling good about accomplishment. Completion of TCRS (Tobacco Cessation Rating Scale) Reviewed lapses, relapses, understanding them and analyzing the situation of a lapse, conflict issues that may be linked to a lapse. The patient denies any abnormal behavioral or mental changes at this time.      Diagnosis: F17.200    Next Visit: 2 weeks

## 2023-07-08 NOTE — PROGRESS NOTES
CHW - Case Closure    This Community Health Worker spoke to patient via telephone today.   Pt reported: Patient continue to have no needs at this time.  Pt denied any additional needs at this time and agrees with episode closure at this time.  Provided patient with Community Health Worker's contact information and encouraged him to contact this Community Health Worker if additional needs arise.       hand/Left:

## 2023-09-13 ENCOUNTER — TELEPHONE (OUTPATIENT)
Dept: SMOKING CESSATION | Facility: CLINIC | Age: 43
End: 2023-09-13
Payer: MEDICAID

## 2024-03-14 ENCOUNTER — CLINICAL SUPPORT (OUTPATIENT)
Dept: SMOKING CESSATION | Facility: CLINIC | Age: 44
End: 2024-03-14
Payer: MEDICAID

## 2024-03-14 DIAGNOSIS — F17.200 NICOTINE DEPENDENCE: Primary | ICD-10-CM

## 2024-03-14 PROCEDURE — 99407 BEHAV CHNG SMOKING > 10 MIN: CPT | Mod: S$GLB,,, | Performed by: GENERAL PRACTICE

## 2024-03-14 PROCEDURE — 99999 PR PBB SHADOW E&M-EST. PATIENT-LVL I: CPT | Mod: PBBFAC,,,

## 2024-03-14 NOTE — PROGRESS NOTES
Spoke with patient today in regard to smoking cessation progress 6/12 month telephone follow up, he states that he is tobacco free.  Commended patient on the accomplishments thus far.  Informed patient of benefit period, future follow up, and contact information if any further help or support is needed.  Will complete smart form for 6/12 month follow up on Quit attempt #1 and resolve episode.

## 2024-12-09 ENCOUNTER — PATIENT OUTREACH (OUTPATIENT)
Dept: ADMINISTRATIVE | Facility: HOSPITAL | Age: 44
End: 2024-12-09
Payer: MEDICAID

## 2025-03-26 ENCOUNTER — PATIENT OUTREACH (OUTPATIENT)
Dept: ADMINISTRATIVE | Facility: HOSPITAL | Age: 45
End: 2025-03-26
Payer: MEDICAID